# Patient Record
Sex: FEMALE | Race: WHITE | NOT HISPANIC OR LATINO | Employment: OTHER | URBAN - METROPOLITAN AREA
[De-identification: names, ages, dates, MRNs, and addresses within clinical notes are randomized per-mention and may not be internally consistent; named-entity substitution may affect disease eponyms.]

---

## 2017-02-09 RX ORDER — SOLIFENACIN SUCCINATE 10 MG/1
TABLET, FILM COATED ORAL
Qty: 30 TABLET | Refills: 3 | Status: SHIPPED | OUTPATIENT
Start: 2017-02-09 | End: 2017-05-24 | Stop reason: SDUPTHER

## 2017-02-09 RX ORDER — LOSARTAN POTASSIUM 25 MG/1
TABLET ORAL
Qty: 60 TABLET | Refills: 3 | Status: SHIPPED | OUTPATIENT
Start: 2017-02-09 | End: 2017-05-24 | Stop reason: SDUPTHER

## 2017-02-09 RX ORDER — DONEPEZIL HYDROCHLORIDE 5 MG/1
TABLET, FILM COATED ORAL
Qty: 30 TABLET | Refills: 3 | Status: SHIPPED | OUTPATIENT
Start: 2017-02-09 | End: 2017-05-24 | Stop reason: SDUPTHER

## 2017-03-01 RX ORDER — LEVOTHYROXINE SODIUM 0.07 MG/1
TABLET ORAL
Qty: 28 TABLET | Refills: 0 | Status: SHIPPED | OUTPATIENT
Start: 2017-03-01 | End: 2017-04-18 | Stop reason: SDUPTHER

## 2017-03-01 RX ORDER — UBIDECARENONE 200 MG
CAPSULE ORAL
Qty: 28 EACH | Refills: 3 | Status: SHIPPED | OUTPATIENT
Start: 2017-03-01 | End: 2017-06-01 | Stop reason: SDUPTHER

## 2017-03-09 DIAGNOSIS — E11.8 TYPE 2 DIABETES MELLITUS WITH COMPLICATION (HCC): ICD-10-CM

## 2017-03-09 RX ORDER — SAXAGLIPTIN 5 MG/1
TABLET, FILM COATED ORAL
Qty: 28 TABLET | Refills: 0 | Status: SHIPPED | OUTPATIENT
Start: 2017-03-09 | End: 2017-04-18 | Stop reason: SDUPTHER

## 2017-04-18 DIAGNOSIS — E03.9 HYPOTHYROIDISM, UNSPECIFIED TYPE: Primary | ICD-10-CM

## 2017-04-18 DIAGNOSIS — E53.9 VITAMIN B DEFICIENCY: ICD-10-CM

## 2017-04-18 DIAGNOSIS — E11.8 TYPE 2 DIABETES MELLITUS WITH COMPLICATION (HCC): ICD-10-CM

## 2017-04-19 RX ORDER — LANOLIN ALCOHOL/MO/W.PET/CERES
CREAM (GRAM) TOPICAL
Qty: 28 TABLET | Refills: 11 | Status: SHIPPED | OUTPATIENT
Start: 2017-04-19

## 2017-04-19 RX ORDER — SAXAGLIPTIN 5 MG/1
TABLET, FILM COATED ORAL
Qty: 28 TABLET | Refills: 11 | Status: SHIPPED | OUTPATIENT
Start: 2017-04-19 | End: 2017-06-07

## 2017-04-19 RX ORDER — LEVOTHYROXINE SODIUM 0.07 MG/1
TABLET ORAL
Qty: 28 TABLET | Refills: 11 | Status: SHIPPED | OUTPATIENT
Start: 2017-04-19 | End: 2018-03-27 | Stop reason: SDUPTHER

## 2017-05-24 DIAGNOSIS — E78.5 HYPERLIPIDEMIA, UNSPECIFIED HYPERLIPIDEMIA TYPE: ICD-10-CM

## 2017-05-24 DIAGNOSIS — G31.09 OTHER FRONTOTEMPORAL DEMENTIA: ICD-10-CM

## 2017-05-24 DIAGNOSIS — E11.9 TYPE 2 DIABETES MELLITUS WITHOUT COMPLICATION, UNSPECIFIED LONG TERM INSULIN USE STATUS: Primary | ICD-10-CM

## 2017-05-24 DIAGNOSIS — F02.80 OTHER FRONTOTEMPORAL DEMENTIA: ICD-10-CM

## 2017-05-24 DIAGNOSIS — I10 ESSENTIAL HYPERTENSION: ICD-10-CM

## 2017-05-24 DIAGNOSIS — R32 URINARY INCONTINENCE, UNSPECIFIED TYPE: ICD-10-CM

## 2017-05-24 RX ORDER — DONEPEZIL HYDROCHLORIDE 5 MG/1
5 TABLET, FILM COATED ORAL NIGHTLY
Qty: 30 TABLET | Refills: 11 | Status: SHIPPED | OUTPATIENT
Start: 2017-05-24 | End: 2018-04-27 | Stop reason: SDUPTHER

## 2017-05-24 RX ORDER — SOLIFENACIN SUCCINATE 10 MG/1
10 TABLET, FILM COATED ORAL EVERY EVENING
Qty: 30 TABLET | Refills: 11 | Status: SHIPPED | OUTPATIENT
Start: 2017-05-24 | End: 2018-04-27 | Stop reason: SDUPTHER

## 2017-05-24 RX ORDER — LOSARTAN POTASSIUM 25 MG/1
25 TABLET ORAL 2 TIMES DAILY
Qty: 60 TABLET | Refills: 11 | Status: SHIPPED | OUTPATIENT
Start: 2017-05-24 | End: 2018-04-27 | Stop reason: SDUPTHER

## 2017-05-24 RX ORDER — LOVASTATIN 40 MG/1
40 TABLET ORAL NIGHTLY
Qty: 30 TABLET | Refills: 11 | Status: SHIPPED | OUTPATIENT
Start: 2017-05-24 | End: 2018-04-27 | Stop reason: SDUPTHER

## 2017-06-01 RX ORDER — UBIDECARENONE 200 MG
CAPSULE ORAL
Qty: 28 EACH | Refills: 11 | Status: SHIPPED | OUTPATIENT
Start: 2017-06-01 | End: 2018-04-27 | Stop reason: SDUPTHER

## 2017-06-07 ENCOUNTER — OFFICE VISIT (OUTPATIENT)
Dept: INTERNAL MEDICINE | Facility: CLINIC | Age: 77
End: 2017-06-07

## 2017-06-07 VITALS
WEIGHT: 133 LBS | DIASTOLIC BLOOD PRESSURE: 62 MMHG | SYSTOLIC BLOOD PRESSURE: 118 MMHG | HEIGHT: 63 IN | BODY MASS INDEX: 23.57 KG/M2 | HEART RATE: 72 BPM

## 2017-06-07 DIAGNOSIS — E22.2 SYNDROME OF INAPPROPRIATE SECRETION OF ANTIDIURETIC HORMONE (HCC): ICD-10-CM

## 2017-06-07 DIAGNOSIS — E03.9 ACQUIRED HYPOTHYROIDISM: ICD-10-CM

## 2017-06-07 DIAGNOSIS — E78.49 OTHER HYPERLIPIDEMIA: ICD-10-CM

## 2017-06-07 DIAGNOSIS — G43.109 MIGRAINE WITH AURA AND WITHOUT STATUS MIGRAINOSUS, NOT INTRACTABLE: ICD-10-CM

## 2017-06-07 DIAGNOSIS — G31.09 FRONTOTEMPORAL DEMENTIA (HCC): Primary | ICD-10-CM

## 2017-06-07 DIAGNOSIS — I10 ESSENTIAL HYPERTENSION: ICD-10-CM

## 2017-06-07 DIAGNOSIS — F02.80 FRONTOTEMPORAL DEMENTIA (HCC): Primary | ICD-10-CM

## 2017-06-07 DIAGNOSIS — E11.9 TYPE 2 DIABETES MELLITUS WITHOUT COMPLICATION, WITHOUT LONG-TERM CURRENT USE OF INSULIN (HCC): ICD-10-CM

## 2017-06-07 DIAGNOSIS — G91.2 NORMAL PRESSURE HYDROCEPHALUS (HCC): ICD-10-CM

## 2017-06-07 DIAGNOSIS — E04.2 NON-TOXIC MULTINODULAR GOITER: ICD-10-CM

## 2017-06-07 LAB
ALBUMIN SERPL-MCNC: 4.04 G/DL (ref 3.4–4.6)
ALBUMIN/GLOB SERPL: 1.5 G/DL
ALP SERPL-CCNC: 59 U/L (ref 46–116)
ALT SERPL W P-5'-P-CCNC: 25 U/L (ref 14–59)
ANION GAP SERPL CALCULATED.3IONS-SCNC: 10 MMOL/L
AST SERPL-CCNC: 20 U/L (ref 7–37)
BASOPHILS # BLD AUTO: 0.04 10*3/MM3 (ref 0–0.2)
BASOPHILS NFR BLD AUTO: 0.4 % (ref 0–2)
BILIRUB SERPL-MCNC: 0.4 MG/DL (ref 0.2–1)
BUN BLD-MCNC: 21 MG/DL (ref 6–22)
BUN/CREAT SERPL: 20.6 (ref 7–25)
CALCIUM SPEC-SCNC: 9.6 MG/DL (ref 8.6–10.5)
CHLORIDE SERPL-SCNC: 99 MMOL/L (ref 95–107)
CO2 SERPL-SCNC: 26 MMOL/L (ref 23–32)
CREAT BLD-MCNC: 1.02 MG/DL (ref 0.55–1.02)
DEPRECATED RDW RBC AUTO: 39.5 FL (ref 37–54)
EOSINOPHIL # BLD AUTO: 0.08 10*3/MM3 (ref 0–0.7)
EOSINOPHIL NFR BLD AUTO: 0.9 % (ref 0–5)
ERYTHROCYTE [DISTWIDTH] IN BLOOD BY AUTOMATED COUNT: 11.5 % (ref 11.5–15)
GFR SERPL CREATININE-BSD FRML MDRD: 53 ML/MIN/1.73
GLOBULIN UR ELPH-MCNC: 2.7 GM/DL
GLUCOSE BLD-MCNC: 208 MG/DL (ref 70–100)
HBA1C MFR BLD: 6.7 % (ref 4–6)
HCT VFR BLD AUTO: 38.8 % (ref 34.1–44.9)
HGB BLD-MCNC: 12.9 G/DL (ref 11.2–15.7)
LYMPHOCYTES # BLD AUTO: 1.47 10*3/MM3 (ref 0.8–7)
LYMPHOCYTES NFR BLD AUTO: 16.4 % (ref 10–60)
MCH RBC QN AUTO: 31.6 PG (ref 26–34)
MCHC RBC AUTO-ENTMCNC: 33.2 G/DL (ref 31–37)
MCV RBC AUTO: 95.1 FL (ref 80–100)
MONOCYTES # BLD AUTO: 0.94 10*3/MM3 (ref 0–1)
MONOCYTES NFR BLD AUTO: 10.5 % (ref 0–13)
NEUTROPHILS # BLD AUTO: 6.44 10*3/MM3 (ref 1–11)
NEUTROPHILS NFR BLD AUTO: 71.8 % (ref 30–85)
PLATELET # BLD AUTO: 305 10*3/MM3 (ref 150–450)
PMV BLD AUTO: 11.2 FL (ref 6–12)
POTASSIUM BLD-SCNC: 4.9 MMOL/L (ref 3.3–5.3)
PROT SERPL-MCNC: 6.7 G/DL (ref 6.3–8.4)
RBC # BLD AUTO: 4.08 10*6/MM3 (ref 3.93–5.22)
SODIUM BLD-SCNC: 135 MMOL/L (ref 136–145)
TSH SERPL DL<=0.05 MIU/L-ACNC: 0.47 MIU/ML (ref 0.4–4.2)
WBC NRBC COR # BLD: 8.97 10*3/MM3 (ref 5–10)

## 2017-06-07 PROCEDURE — 36415 COLL VENOUS BLD VENIPUNCTURE: CPT | Performed by: INTERNAL MEDICINE

## 2017-06-07 PROCEDURE — 83036 HEMOGLOBIN GLYCOSYLATED A1C: CPT | Performed by: INTERNAL MEDICINE

## 2017-06-07 PROCEDURE — 85025 COMPLETE CBC W/AUTO DIFF WBC: CPT | Performed by: INTERNAL MEDICINE

## 2017-06-07 PROCEDURE — 84443 ASSAY THYROID STIM HORMONE: CPT | Performed by: INTERNAL MEDICINE

## 2017-06-07 PROCEDURE — 99214 OFFICE O/P EST MOD 30 MIN: CPT | Performed by: INTERNAL MEDICINE

## 2017-06-07 PROCEDURE — 80053 COMPREHEN METABOLIC PANEL: CPT | Performed by: INTERNAL MEDICINE

## 2017-06-07 NOTE — PROGRESS NOTES
Subjective   Rissa Hodges is a 76 y.o. female.     History of Present Illness she is here today for her yearly visit which includes follow-up of dementia, hypertension, hypercholesterolemia, diabetes mellitus, and normal pressure hydrocephalus.  She is living in assisted living.  Unfortunately she recently had her  cat die.  She has been depressed about this naturally.  Her daughter relates that she has been less motivated to leave her room over the last several months.  She eats once or twice per day generally.  She often has the room temperature turned up to excessive levels.  Occasionally she will have some dysphagia and cough.  She denies any dizziness or headache.  There have been no falls.  She does use her walker at all times.  She denied any dyspnea, chest pain, swelling in the ankles, wheezing, or PND.  The remainder of her review systems is negative.        Review of Systems   Constitutional: Positive for activity change. Negative for fatigue.   HENT: Positive for trouble swallowing.    Respiratory: Positive for cough. Negative for chest tightness, shortness of breath and wheezing.    Cardiovascular: Negative for chest pain, palpitations and leg swelling.   Gastrointestinal: Negative for abdominal pain, anal bleeding, blood in stool, constipation, diarrhea, nausea and vomiting.   Genitourinary: Negative for difficulty urinating, dysuria, flank pain, frequency and hematuria.   Musculoskeletal: Negative for arthralgias, back pain and gait problem.   Neurological: Positive for headaches. Negative for dizziness, syncope, facial asymmetry, speech difficulty and weakness.   Psychiatric/Behavioral: The patient is not nervous/anxious.        Objective   Physical Exam   Constitutional: Vital signs are normal. She appears well-developed and well-nourished. She is active. She does not appear ill.   Eyes: Conjunctivae are normal.   Fundoscopic exam:       The right eye shows no AV nicking, no exudate and no  hemorrhage.        The left eye shows no AV nicking, no exudate and no hemorrhage.   Neck: Carotid bruit is not present. No thyroid mass and no thyromegaly present.   Cardiovascular: Normal rate, regular rhythm, S1 normal and S2 normal.  Exam reveals no S3 and no S4.    No murmur heard.  Pulses:       Posterior tibial pulses are 2+ on the right side, and 2+ on the left side.   Pulmonary/Chest: No tachypnea. No respiratory distress. She has no decreased breath sounds. She has no wheezes. She has no rhonchi. She has no rales.   Abdominal: Soft. Normal appearance and bowel sounds are normal. She exhibits no abdominal bruit and no mass. There is no hepatosplenomegaly.       Vascular Status -  Her exam exhibits no right foot edema. Her exam exhibits no left foot edema.  Neurological: She is alert. Gait abnormal.   Reflex Scores:       Bicep reflexes are 2+ on the right side.  Mild slowing only and stable with use of a walker   Psychiatric: She has a normal mood and affect. Her speech is normal and behavior is normal. Judgment and thought content normal. Cognition and memory are impaired.       Assessment/Plan November A1c was 7.4.  CMP was normal with the exception of a glucose of 199    Assessment #1 hypertension-good control #2 dementia-slowly progressive over the last 2 years #3 diabetes mellitus-must be careful for hypoglycemia with her decreased by mouth intake #4 normal pressure hydrocephalus-no sign of significant worsening #5 syndrome of inappropriate ADH-compensated with fluid restriction at present    Plan #1 no change medication #2 CBC, CMP, TSH, hemoglobin A1c today.  Routine follow-up with me in 6 months.

## 2017-06-07 NOTE — PROGRESS NOTES
Dr. Don wanted patient to stop Onglyza.  I called Josie, the daughter, to let her know.      I also called [facility].  I had to leave a message to tell them that the Onglyza needs to be stopped for them to not send in with her weekly medications.

## 2017-10-11 RX ORDER — CHLORAL HYDRATE 500 MG
CAPSULE ORAL
Qty: 30 CAPSULE | Refills: 11 | Status: SHIPPED | OUTPATIENT
Start: 2017-10-11 | End: 2018-09-27 | Stop reason: SDUPTHER

## 2017-11-03 ENCOUNTER — OFFICE VISIT (OUTPATIENT)
Dept: INTERNAL MEDICINE | Facility: CLINIC | Age: 77
End: 2017-11-03

## 2017-11-03 VITALS
BODY MASS INDEX: 22.86 KG/M2 | DIASTOLIC BLOOD PRESSURE: 70 MMHG | WEIGHT: 129 LBS | SYSTOLIC BLOOD PRESSURE: 120 MMHG | HEART RATE: 78 BPM | HEIGHT: 63 IN

## 2017-11-03 DIAGNOSIS — I10 ESSENTIAL HYPERTENSION: ICD-10-CM

## 2017-11-03 DIAGNOSIS — F02.80 FRONTOTEMPORAL DEMENTIA (HCC): ICD-10-CM

## 2017-11-03 DIAGNOSIS — G91.2 NORMAL PRESSURE HYDROCEPHALUS (HCC): ICD-10-CM

## 2017-11-03 DIAGNOSIS — E03.9 ACQUIRED HYPOTHYROIDISM: ICD-10-CM

## 2017-11-03 DIAGNOSIS — E11.9 TYPE 2 DIABETES MELLITUS WITHOUT COMPLICATION, WITHOUT LONG-TERM CURRENT USE OF INSULIN (HCC): Primary | ICD-10-CM

## 2017-11-03 DIAGNOSIS — G31.09 FRONTOTEMPORAL DEMENTIA (HCC): ICD-10-CM

## 2017-11-03 DIAGNOSIS — E22.2 SYNDROME OF INAPPROPRIATE SECRETION OF ANTIDIURETIC HORMONE (HCC): ICD-10-CM

## 2017-11-03 LAB
ALBUMIN SERPL-MCNC: 4.3 G/DL (ref 3.5–5.2)
ALBUMIN/GLOB SERPL: 1.7 G/DL
ALP SERPL-CCNC: 52 U/L (ref 39–117)
ALT SERPL W P-5'-P-CCNC: 13 U/L (ref 1–33)
ANION GAP SERPL CALCULATED.3IONS-SCNC: 14.1 MMOL/L
AST SERPL-CCNC: 12 U/L (ref 1–32)
BASOPHILS # BLD AUTO: 0.04 10*3/MM3 (ref 0–0.2)
BASOPHILS NFR BLD AUTO: 0.5 % (ref 0–2)
BILIRUB SERPL-MCNC: 0.4 MG/DL (ref 0.1–1.2)
BUN BLD-MCNC: 23 MG/DL (ref 8–23)
BUN/CREAT SERPL: 23.2 (ref 7–25)
CALCIUM SPEC-SCNC: 10.1 MG/DL (ref 8.6–10.5)
CHLORIDE SERPL-SCNC: 95 MMOL/L (ref 98–107)
CO2 SERPL-SCNC: 24.9 MMOL/L (ref 22–29)
CREAT BLD-MCNC: 0.99 MG/DL (ref 0.57–1)
DEPRECATED RDW RBC AUTO: 38.6 FL (ref 37–54)
EOSINOPHIL # BLD AUTO: 0.15 10*3/MM3 (ref 0–0.7)
EOSINOPHIL NFR BLD AUTO: 1.9 % (ref 0–5)
ERYTHROCYTE [DISTWIDTH] IN BLOOD BY AUTOMATED COUNT: 11.4 % (ref 11.5–15)
GFR SERPL CREATININE-BSD FRML MDRD: 54 ML/MIN/1.73
GLOBULIN UR ELPH-MCNC: 2.6 GM/DL
GLUCOSE BLD-MCNC: 212 MG/DL (ref 65–99)
HBA1C MFR BLD: 7.6 % (ref 4.8–5.6)
HCT VFR BLD AUTO: 38.5 % (ref 34.1–44.9)
HGB BLD-MCNC: 13 G/DL (ref 11.2–15.7)
LYMPHOCYTES # BLD AUTO: 1.7 10*3/MM3 (ref 0.8–7)
LYMPHOCYTES NFR BLD AUTO: 21.5 % (ref 10–60)
MCH RBC QN AUTO: 31.9 PG (ref 26–34)
MCHC RBC AUTO-ENTMCNC: 33.8 G/DL (ref 31–37)
MCV RBC AUTO: 94.4 FL (ref 80–100)
MONOCYTES # BLD AUTO: 0.97 10*3/MM3 (ref 0–1)
MONOCYTES NFR BLD AUTO: 12.3 % (ref 0–13)
NEUTROPHILS # BLD AUTO: 5.04 10*3/MM3 (ref 1–11)
NEUTROPHILS NFR BLD AUTO: 63.8 % (ref 30–85)
PLATELET # BLD AUTO: 279 10*3/MM3 (ref 150–450)
PMV BLD AUTO: 10.5 FL (ref 6–12)
POTASSIUM BLD-SCNC: 4.5 MMOL/L (ref 3.5–5.2)
PROT SERPL-MCNC: 6.9 G/DL (ref 6–8.5)
RBC # BLD AUTO: 4.08 10*6/MM3 (ref 3.93–5.22)
SODIUM BLD-SCNC: 134 MMOL/L (ref 136–145)
TSH SERPL DL<=0.05 MIU/L-ACNC: 2.48 MIU/ML (ref 0.27–4.2)
VIT B12 BLD-MCNC: >2000 PG/ML (ref 211–946)
WBC NRBC COR # BLD: 7.9 10*3/MM3 (ref 5–10)

## 2017-11-03 PROCEDURE — 85025 COMPLETE CBC W/AUTO DIFF WBC: CPT | Performed by: INTERNAL MEDICINE

## 2017-11-03 PROCEDURE — 84443 ASSAY THYROID STIM HORMONE: CPT | Performed by: INTERNAL MEDICINE

## 2017-11-03 PROCEDURE — 99214 OFFICE O/P EST MOD 30 MIN: CPT | Performed by: INTERNAL MEDICINE

## 2017-11-03 PROCEDURE — 36415 COLL VENOUS BLD VENIPUNCTURE: CPT | Performed by: INTERNAL MEDICINE

## 2017-11-03 PROCEDURE — 83036 HEMOGLOBIN GLYCOSYLATED A1C: CPT | Performed by: INTERNAL MEDICINE

## 2017-11-03 PROCEDURE — 82607 VITAMIN B-12: CPT | Performed by: INTERNAL MEDICINE

## 2017-11-03 PROCEDURE — 80053 COMPREHEN METABOLIC PANEL: CPT | Performed by: INTERNAL MEDICINE

## 2017-11-03 NOTE — PROGRESS NOTES
Subjective   Rissa Hodges is a 77 y.o. female.     History of Present Illness he is here today for yearly follow-up of hypertension, hypercholesterolemia, dementia, and history of normal pressure hydrocephalus with shunt placement.  The daughter relates that at assisted living she sleeps all the time.  She is reluctant to walk and she is reluctant to leave her room and to join others at mealtime.  The patient herself has few complaints although she relates feeling dizzy a lot of the time especially when standing up.  Her daughter does relate that she has been having difficulty with dysphagia and choking over the last few months.  The patient specifically denies headache or dysarthria.  She denies any blurred vision or double vision.  There has been no focal paresis or paresthesias.  She continues to walk with her walker.  She denies any cough although she does relate she is short of breath all the time.  She denies any PND.  She has not had any swelling in the ankles.  She denies chest pain.  There has been no nausea or vomiting or abdominal pain.  She denies diarrhea.  There has been no dysuria.  She eats only once or twice per day.  Fasting blood sugar this morning was 186.        Review of Systems   Constitutional: Positive for activity change, appetite change and fatigue. Negative for fever.   HENT: Positive for trouble swallowing.    Respiratory: Negative for cough, chest tightness, shortness of breath and wheezing.    Cardiovascular: Negative for chest pain, palpitations and leg swelling.   Gastrointestinal: Negative for abdominal pain, anal bleeding, blood in stool, constipation, diarrhea, nausea and vomiting.   Genitourinary: Negative for dysuria, flank pain and hematuria.   Musculoskeletal: Positive for gait problem. Negative for arthralgias and back pain.   Neurological: Positive for dizziness. Negative for syncope, facial asymmetry, speech difficulty, weakness and headaches.   Psychiatric/Behavioral:  Negative for dysphoric mood. The patient is not nervous/anxious.        Objective   Physical Exam   Constitutional: She appears well-developed and well-nourished. She is active. She does not appear ill.   Eyes: Conjunctivae are normal.   Fundoscopic exam:       The right eye shows no AV nicking, no exudate and no hemorrhage.        The left eye shows no AV nicking, no exudate and no hemorrhage.   Neck: Carotid bruit is not present.   Cardiovascular: Normal rate, regular rhythm, S1 normal and S2 normal.   Occasional extrasystoles are present. Exam reveals no S3 and no S4.    No murmur heard.  Pulmonary/Chest: No tachypnea. No respiratory distress. She has no decreased breath sounds. She has no wheezes. She has no rhonchi. She has no rales.   Abdominal: Soft. Normal appearance and bowel sounds are normal. She exhibits no abdominal bruit and no mass. There is no hepatosplenomegaly. There is no tenderness.       Vascular Status -  Her exam exhibits no right foot edema. Her exam exhibits no left foot edema.  Lymphadenopathy:     She has no cervical adenopathy.     She has no axillary adenopathy.   Neurological: She is alert. She has normal strength. She displays no tremor. No cranial nerve deficit. She displays a negative Romberg sign. Gait abnormal.   Reflex Scores:       Bicep reflexes are 1+ on the right side and 1+ on the left side.  She walks slowly with small steps unassisted.  She walks well with walker however.   Psychiatric: She has a normal mood and affect. Her speech is normal and behavior is normal. Thought content normal. Cognition and memory are impaired.       Assessment/Plan  blood pressure is 144/73 lying and 130/68 standing.  Lab work in June showed a normal CBC and TSH.  Hemoglobin A1c was 6.7.  Sodium 135 glucose 208.    Assessment #1 dementia-most likely she is having progressive deterioration on this basis.  #2 hypertension-good control and without significant orthostatic hypotension #3  dysphagia-likely related to dementia #4 history of normal pressure hydrocephalus-without recurrence but of course this must be considered #5 syndrome of inappropriate ADH-must be certain that she does not have more severe hyponatremia #6 diabetes mellitus-likely reasonable control without hypoglycemia.      Plan #1 no change medication #2 CBC, CMP, urinalysis, TSH, B12 level #3 video swallowing study #4 CT scan of the head without IV contrast.  Routine follow-up with me in one month.  35 minutes was spent in this visit.

## 2017-11-13 ENCOUNTER — HOSPITAL ENCOUNTER (OUTPATIENT)
Dept: GENERAL RADIOLOGY | Facility: HOSPITAL | Age: 77
Discharge: HOME | End: 2017-11-13
Attending: INTERNAL MEDICINE

## 2017-11-13 ENCOUNTER — HOSPITAL ENCOUNTER (OUTPATIENT)
Dept: CT IMAGING | Facility: HOSPITAL | Age: 77
Discharge: HOME | End: 2017-11-13
Attending: INTERNAL MEDICINE | Admitting: INTERNAL MEDICINE

## 2017-11-13 DIAGNOSIS — G31.09 FRONTOTEMPORAL DEMENTIA (HCC): ICD-10-CM

## 2017-11-13 DIAGNOSIS — F02.80 FRONTOTEMPORAL DEMENTIA (HCC): ICD-10-CM

## 2017-11-13 PROCEDURE — G8996 SWALLOW CURRENT STATUS: HCPCS

## 2017-11-13 PROCEDURE — 70450 CT HEAD/BRAIN W/O DYE: CPT

## 2017-11-13 PROCEDURE — 74230 X-RAY XM SWLNG FUNCJ C+: CPT

## 2017-11-13 PROCEDURE — G8997 SWALLOW GOAL STATUS: HCPCS

## 2017-11-13 PROCEDURE — G8998 SWALLOW D/C STATUS: HCPCS

## 2017-11-13 PROCEDURE — 92611 MOTION FLUOROSCOPY/SWALLOW: CPT

## 2017-11-13 NOTE — MBS/VFSS/FEES
Outpatient Speech Language Pathology   Adult Swallow Initial Evaluation  [facility]     Patient Name: Rissa Hodges  : 1940  MRN: [id number]  Today's Date: 2017         Visit Date: 2017   Patient Active Problem List   Diagnosis   • Migraine with aura   • Compression fracture of lumbar vertebra   • Type 2 diabetes mellitus   • Diabetic peripheral neuropathy   • Frontotemporal dementia   • Hyperlipidemia   • Hypertension   • Syndrome of inappropriate secretion of antidiuretic hormone   • Degeneration of intervertebral disc of lumbar region   • Non-toxic multinodular goiter   • Normal pressure hydrocephalus   • Osteopenia   • Hypothyroidism   • Urinary incontinence   • Vitamin D deficiency        Past Medical History:   Diagnosis Date   • Cognitive dysfunction    • Confusion    • Dementia    • Diabetes mellitus with neurological manifestations, uncontrolled    • Hearing loss of both ears    • HLD (hyperlipidemia)    • HTN (hypertension)    • Hydrocephalus    • Hyponatremia    • Osteopenia    • Sinusitis    • Uncontrolled type 2 diabetes mellitus with complication    • Vitamin D deficiency    SPEECH-LANGUAGE PATHOLOGY EVALUTION - VFSS  Subjective: The patient was seen on this date for a VFSS(Videofluoroscopic Swallowing Study).  Patient was alert and cooperative.  Pt w/ dementia per MD.  Pt hard of hearing.  Daughter available for eval, waited outside, then educated w/ results.  The patient's history is significant for dementia, .   Objective: Risks/benefits were reviewed with the patient and family, and consent was obtained. The study was completed with SLP present and Radiologist review. The patient was seen in lateral view(s). Textures given included thin liquid, nectar thick liquid, puree consistency, mechanical soft consistency and regular consistency.  Assessment: Difficulties were noted with regular consistency, characterized by prespill of partially masticated solids to valleculae.  No  aspiration/penetration noted with any consistencies.  Functional swallow.     SLP Findings: Patient presents with functional swallow.     Recommendations: Diet Textures: thin liquid, regular consistency food. Medications should be taken whole with thin liquids.   Recommended Strategies: Upright for PO. Oral care before breakfast, after all meals and PRN.  Other Recommended Evaluations:    Dysphagia therapy is not recommended. Rationale: functional swallow.         Past Surgical History:   Procedure Laterality Date   • BACK SURGERY     • CSF SHUNT  07/2008    for NPH         Visit Dx:     ICD-10-CM ICD-9-CM   1. Frontotemporal dementia G31.09 331.19    F02.80                    Adult Dysphagia - 11/13/17 1000     Adult Dysphagia Background    Patient Description of Complaint difficulty swallowing  -MG    Symptoms Reported by Patient Difficulty swallowing solids  -MG    Current Diet (Solids) Regular  -MG    Diet Level (Liquids) Thin Liquid  -MG    Oral Mech    Respiratory/Swallow Coordination Pattern WFL  -MG    Position During Evaluation Upright  -MG    Anatomy/Physiology WNL Patient demonstrates anatomy that is WNL  -MG    Dentition Dentures were worn for this evaluation  -MG    Oral Health Oral cavity is clean  -MG    Awareness/Control of Secretions Patient swallows saliva  -MG    Foods/Liquids Presented    Method of Administration Spoon;Cup;Straw;Self-fed;Fed by examiner  -MG    Spoon    Consistency Thin;Nectar;Puree;Hard Solid;Mixed Consistency  -MG    SLP Communication to Radiology    Severity Level of Dysphagia mild dysphagia;pharyngeal dysfunction  -MG    Summary Statement Pt w/ functional swallow.  No aspiration/penetration. Mild prespill to valleculae w/ thins, partially masticated prespill solids to valleculae, cleared w/ swallow  -MG    Results/Recs/Barriers/Education for Dysphagia    Factors Affecting Performance no difficulty participating in study  -MG      User Key  (r) = Recorded By, (t) = Taken By,  (c) = Cosigned By    Initials Name Provider Type    MG Maricruz Kuo MA,CCC-SLP Speech and Language Pathologist                            OP SLP Education       11/13/17 1021    Education    Barriers to Learning No barriers identified  -MG    Education Provided Described results of evaluation;Family/caregivers expressed understanding of evaluation;Patient expressed understanding of evaluation  -MG    Assessed Learning needs  -MG    Learning Motivation Strong  -MG    Learning Method Explanation  -MG    Teaching Response Verbalized understanding;Demonstrated understanding   daughter verbalized understanding, pt demonstrated understanding, ?dementia  -MG      User Key  (r) = Recorded By, (t) = Taken By, (c) = Cosigned By    Initials Name Effective Dates    MG Maricruz Kuo MA,CCC-SLP 04/13/15 -                       SLP Outcome Measures (last 72 hours)      SLP Outcome Measures       11/13/17 1000          SLP Outcome Measures    Outcome Measure Used? Adult NOMS  -MG      FCM Scores    FCM Chosen Swallowing  -MG      Swallowing FCM Score 7  -MG        User Key  (r) = Recorded By, (t) = Taken By, (c) = Cosigned By    Initials Name Effective Dates    MG Maricruz Kuo MA, CCC-SLP 04/13/15 -                Time Calculation:   SLP Start Time: 0915  SLP Stop Time: 1000  SLP Time Calculation (min): 45 min    Therapy Charges for Today     Code Description Service Date Service Provider Modifiers Qty    11018478954 HC ST SWALLOWING CURRENT STATUS 11/13/2017 Maricruz Kuo MACCC-SLP ,  1    86662411105 HC ST SWALLOWING PROJECTED 11/13/2017 Maricruz Kuo [facility]-SLP RICO,  1    87044673393 HC ST SWALLOWING DISCHARGE 11/13/2017 Maricruz Kuo [facility]-SLP ,  1    01598218524 HC ST MOTION FLUORO EVAL SWALLOW 3 11/13/2017 Maricruz Kuo MAC[facility]-SLP GN 1          SLP G-Codes  SLP NOMS Used?: Yes  Functional Limitations: Swallowing  Swallow Current Status (): 0 percent impaired, limited or  restricted  Swallow Goal Status (): 0 percent impaired, limited or restricted  Swallow Discharge Status (): 0 percent impaired, limited or restricted        Maricruz Kuo MA,CCC-SLP  11/13/2017

## 2017-11-13 NOTE — SIGNIFICANT NOTE
11/13/17 1021   Education   Barriers to Learning No barriers identified   Education Provided Described results of evaluation;Family/caregivers expressed understanding of evaluation;Patient expressed understanding of evaluation   Assessed Learning needs   Learning Motivation Strong   Learning Method Explanation   Teaching Response Verbalized understanding;Demonstrated understanding  (daughter verbalized understanding, pt demonstrated understanding, ?dementia)

## 2017-11-14 ENCOUNTER — TELEPHONE (OUTPATIENT)
Dept: INTERNAL MEDICINE | Facility: CLINIC | Age: 77
End: 2017-11-14

## 2017-11-14 DIAGNOSIS — G91.9 HYDROCEPHALUS IN ADULT (HCC): Primary | ICD-10-CM

## 2017-11-14 NOTE — TELEPHONE ENCOUNTER
----- Message from Sacha Don Jr., MD sent at 11/14/2017  9:14 AM EST -----  Video swallowing study fortunately did not show any aspiration.  CT scan of the head does show mild increase in hydrocephalus.  This is of unclear significance.  We need to consult Dr. Trotter neurosurgery.

## 2017-11-15 ENCOUNTER — TELEPHONE (OUTPATIENT)
Dept: INTERNAL MEDICINE | Facility: CLINIC | Age: 77
End: 2017-11-15

## 2017-11-15 NOTE — TELEPHONE ENCOUNTER
----- Message from Sweetie Schwartz sent at 11/15/2017  9:48 AM EST -----  Contact: [facility]  Pharmacy called needing clarification on the quantity and number of refills for Centrum Silver.  Please advise.    [facility] - [address] - [phone] x [phone]  - [phone] FX

## 2017-12-22 ENCOUNTER — OFFICE VISIT (OUTPATIENT)
Dept: NEUROSURGERY | Facility: CLINIC | Age: 77
End: 2017-12-22

## 2017-12-22 VITALS
WEIGHT: 129 LBS | HEART RATE: 66 BPM | SYSTOLIC BLOOD PRESSURE: 128 MMHG | HEIGHT: 63 IN | BODY MASS INDEX: 22.86 KG/M2 | DIASTOLIC BLOOD PRESSURE: 76 MMHG

## 2017-12-22 DIAGNOSIS — G91.2 NORMAL PRESSURE HYDROCEPHALUS (HCC): Primary | ICD-10-CM

## 2017-12-22 DIAGNOSIS — R26.9 ABNORMALITY OF GAIT: ICD-10-CM

## 2017-12-22 PROCEDURE — 99203 OFFICE O/P NEW LOW 30 MIN: CPT | Performed by: NEUROLOGICAL SURGERY

## 2017-12-22 NOTE — PROGRESS NOTES
Subjective   Patient ID: Rissa Hodges is a 77 y.o. female is being seen for consultation today at the request of Sacha Don Jr., MD hydrocephalus.     Today the patient reports increased headaches, fatigue, dizziness and trouble with her memory. She also complains of bladder incontinence.    History of Present Illness    The following portions of the patient's history were reviewed and updated as appropriate: allergies, current medications, past family history, past medical history, past social history, past surgical history and problem list.    Review of Systems   Constitutional: Positive for fatigue.   HENT: Positive for hearing loss.    Endocrine: Positive for cold intolerance.   Neurological: Positive for dizziness and weakness.   Psychiatric/Behavioral: Positive for decreased concentration.   All other systems reviewed and are negative.    The patient is with her daughter.  She has a history of having been shunted in 2008 in [place] by Dr. Randy Das for normal pressure hydrocephalus.  Apparently she had a Cogman programmable valve placed, set at 150 mmHg.  The daughter says that the patient's gait improved considerably.  She also said oddly enough that she had headache prior to surgery.  The patient states that she had a history of migraines even before the surgery and well into adulthood.  She has now been living in [place] for 4 years.  The daughter notices that she has been having more difficulties over the last year or 2.  No significant headaches.  They have not seen a neurosurgeon since moving here.  The shunt moves well but pumps well but refills somewhat slowly.  Her skin is quite thin even though the shunt apparatus has not broken through and I told the patient's daughter that she should be very careful about combing or brushing her hair and that the hairdressers need to be very careful when they wash her hair.      It is hard to pin this down and to say that the deterioration is result of  either shunt malfunction or NPH per se.  She has normal reflexes so I doubt any cervical cord compression.  I told them at the start that we should check to see what the setting is to make sure it has not changed since the surgery 9 years ago.  Supposed to be at 150.  Will also check a shunt series.  It is possible that we might need to do a shuntogram but will wait until after the next visit to determine that.  I do not think she needs an MRI just at this point.        Objective   Physical Exam   Constitutional: She is oriented to person, place, and time. She appears well-developed and well-nourished.   HENT:   Head: Normocephalic and atraumatic.   Eyes: Conjunctivae and EOM are normal. Pupils are equal, round, and reactive to light.   Fundoscopic exam:       The right eye shows no papilledema. The right eye shows venous pulsations.        The left eye shows no papilledema. The left eye shows venous pulsations.   Neck: Carotid bruit is not present.   Neurological: She is oriented to person, place, and time. She has a normal Finger-Nose-Finger Test and a normal Heel to Shin Test. Gait normal.   Reflex Scores:       Tricep reflexes are 2+ on the right side and 2+ on the left side.       Bicep reflexes are 2+ on the right side and 2+ on the left side.       Brachioradialis reflexes are 2+ on the right side and 2+ on the left side.       Patellar reflexes are 2+ on the right side and 2+ on the left side.       Achilles reflexes are 2+ on the right side and 2+ on the left side.  Psychiatric: Her speech is normal.     Neurologic Exam     Mental Status   Oriented to person, place, and time.   Registration of memory: Good recent and remote memory.   Attention: normal. Concentration: normal.   Speech: speech is normal   Level of consciousness: alert  Knowledge: consistent with education.     Cranial Nerves     CN II   Visual fields full to confrontation.   Visual acuity: normal    CN III, IV, VI   Pupils are equal, round,  and reactive to light.  Extraocular motions are normal.     CN V   Facial sensation intact.   Right corneal reflex: normal  Left corneal reflex: normal    CN VII   Facial expression full, symmetric.   Right facial weakness: none  Left facial weakness: none    CN VIII   Hearing: intact    CN IX, X   Palate: symmetric    CN XI   Right sternocleidomastoid strength: normal  Left sternocleidomastoid strength: normal    CN XII   Tongue: not atrophic  Tongue deviation: none    Motor Exam   Muscle bulk: normal  Right arm tone: normal  Left arm tone: normal  Right leg tone: normal  Left leg tone: normal    Strength   Strength 5/5 except as noted.     Sensory Exam   Light touch normal.     Gait, Coordination, and Reflexes     Gait  Gait: normal    Coordination   Finger to nose coordination: normal  Heel to shin coordination: normal    Reflexes   Right brachioradialis: 2+  Left brachioradialis: 2+  Right biceps: 2+  Left biceps: 2+  Right triceps: 2+  Left triceps: 2+  Right patellar: 2+  Left patellar: 2+  Right achilles: 2+  Left achilles: 2+  Right : 2+  Left : 2+       Using a walker.  Shuffling gait.       Assessment/Plan   Independent Review of Radiographic Studies:    I reviewed the CT scan done on 11/13/2017 which showed a ventricular catheter into the frontal horns.  Oddly enough, the position looks slightly different compared to the 2015 scan despite there not being any shunt revision.   Agree with the report.         Medical Decision Making:    There are a lot of factors that may be involved here, possibly not even related to the shunt.  We will check the shunt setting, the fluoroscopic x-ray, the shunt series to check for continuity of the shunt.  The shunt pumps well, but refills slowly suggestive of a possible proximal problem.  We may need to do a shuntogram later on but will assess her after the x-rays are done.        Diagnoses and all orders for this visit:    Normal pressure hydrocephalus  -     XR  Shunt Series; Future  -     FL shunt series programable; Future    Abnormality of gait  -     XR Shunt Series; Future  -     FL shunt series programable; Future    Return in about 7 days (around 12/29/2017).

## 2017-12-29 ENCOUNTER — HOSPITAL ENCOUNTER (OUTPATIENT)
Dept: GENERAL RADIOLOGY | Facility: HOSPITAL | Age: 77
Discharge: HOME | End: 2017-12-29
Attending: NEUROLOGICAL SURGERY | Admitting: NEUROLOGICAL SURGERY

## 2017-12-29 DIAGNOSIS — G91.2 NORMAL PRESSURE HYDROCEPHALUS (HCC): ICD-10-CM

## 2017-12-29 DIAGNOSIS — R26.9 ABNORMALITY OF GAIT: ICD-10-CM

## 2017-12-29 PROCEDURE — 76000 FLUOROSCOPY <1 HR PHYS/QHP: CPT

## 2017-12-29 PROCEDURE — 74000 HC ABDOMEN KUB: CPT

## 2017-12-29 PROCEDURE — 71020 HC CHEST PA AND LATERAL: CPT

## 2017-12-29 PROCEDURE — 70250 X-RAY EXAM OF SKULL: CPT

## 2018-01-17 ENCOUNTER — OFFICE VISIT (OUTPATIENT)
Dept: NEUROSURGERY | Facility: CLINIC | Age: 78
End: 2018-01-17

## 2018-01-17 VITALS
WEIGHT: 129 LBS | HEIGHT: 63 IN | SYSTOLIC BLOOD PRESSURE: 122 MMHG | HEART RATE: 64 BPM | DIASTOLIC BLOOD PRESSURE: 78 MMHG | BODY MASS INDEX: 22.86 KG/M2

## 2018-01-17 DIAGNOSIS — R26.9 ABNORMALITY OF GAIT: ICD-10-CM

## 2018-01-17 DIAGNOSIS — Z45.41 ENCOUNTER FOR MANAGEMENT OF CEREBROSPINAL FLUID DRAINAGE DEVICE: ICD-10-CM

## 2018-01-17 DIAGNOSIS — G91.2 NORMAL PRESSURE HYDROCEPHALUS (HCC): Primary | ICD-10-CM

## 2018-01-17 PROCEDURE — 99213 OFFICE O/P EST LOW 20 MIN: CPT | Performed by: NEUROLOGICAL SURGERY

## 2018-01-17 NOTE — PROGRESS NOTES
Subjective   Patient ID: Rissa Hodges is a 77 y.o. female is here today for follow-up on hydrocephalus.    At the patient's last visit she reported increased headaches, fatigue, dizziness and trouble with her memory. She also complains of bladder incontinence.    Today the patientt is here with a new x-ray. She reports that there have been no changes in her symptoms since last visit.    History of Present Illness    The following portions of the patient's history were reviewed and updated as appropriate: allergies, current medications, past family history, past medical history, past social history, past surgical history and problem list.    Review of Systems   Neurological: Positive for dizziness and headaches.   All other systems reviewed and are negative.    She is with her daughter. Unfortunately, the shunt series was not done, but the shunt x-rays showed that her Codman shunt is set at 110 mmHg. Today, it pumps and actually refills well exactly like it is supposed to.  The problems that she is having have to do mainly with sleepiness and dementia.  She is having more difficulty walking, but when she was shunted 10 years ago in [place], it was mainly the gait that was the problem and that was helped.  If we were to answer definitely if the shunt is working, I think a shuntogram is necessary, but I suspect the answer is that the shunt is working and we are dealing with some other dementing illness. I expressed this to the daughter, but again if we want a definite answer, then a shuntogram along with getting a shunt series would be helpful.  The daughter will think about it and will let us know if she would like to proceed.  She used to see Dr. Perico Lazar, but after Dr. Lazar retired, she is no longer seeing a neurologist.      Objective   Physical Exam   Constitutional: She is oriented to person, place, and time. She appears well-developed and well-nourished.   HENT:   Head: Normocephalic and atraumatic.    Eyes: Conjunctivae and EOM are normal. Pupils are equal, round, and reactive to light.   Fundoscopic exam:       The right eye shows no papilledema. The right eye shows venous pulsations.        The left eye shows no papilledema. The left eye shows venous pulsations.   Neck: Carotid bruit is not present.   Neurological: She is oriented to person, place, and time. She has a normal Finger-Nose-Finger Test and a normal Heel to Shin Test. Gait normal.   Reflex Scores:       Tricep reflexes are 2+ on the right side and 2+ on the left side.       Bicep reflexes are 2+ on the right side and 2+ on the left side.       Brachioradialis reflexes are 2+ on the right side and 2+ on the left side.       Patellar reflexes are 2+ on the right side and 2+ on the left side.       Achilles reflexes are 2+ on the right side and 2+ on the left side.  Psychiatric: Her speech is normal.     Neurologic Exam     Mental Status   Oriented to person, place, and time.   Registration of memory: Good recent and remote memory.   Attention: normal. Concentration: normal.   Speech: speech is normal   Level of consciousness: alert  Knowledge: consistent with education.     Cranial Nerves     CN II   Visual fields full to confrontation.   Visual acuity: normal    CN III, IV, VI   Pupils are equal, round, and reactive to light.  Extraocular motions are normal.     CN V   Facial sensation intact.   Right corneal reflex: normal  Left corneal reflex: normal    CN VII   Facial expression full, symmetric.   Right facial weakness: none  Left facial weakness: none    CN VIII   Hearing: intact    CN IX, X   Palate: symmetric    CN XI   Right sternocleidomastoid strength: normal  Left sternocleidomastoid strength: normal    CN XII   Tongue: not atrophic  Tongue deviation: none    Motor Exam   Muscle bulk: normal  Right arm tone: normal  Left arm tone: normal  Right leg tone: normal  Left leg tone: normal    Strength   Strength 5/5 except as noted.     Sensory  Exam   Light touch normal.     Gait, Coordination, and Reflexes     Gait  Gait: normal    Coordination   Finger to nose coordination: normal  Heel to shin coordination: normal    Reflexes   Right brachioradialis: 2+  Left brachioradialis: 2+  Right biceps: 2+  Left biceps: 2+  Right triceps: 2+  Left triceps: 2+  Right patellar: 2+  Left patellar: 2+  Right achilles: 2+  Left achilles: 2+  Right : 2+  Left : 2+      Assessment/Plan   Independent Review of Radiographic Studies:    The shunt x-ray showed that her shunt is set at 110 mmHg. Agree with the report.      Medical Decision Making:    Her daughter will let us know if she would like to proceed with a shuntogram and a shunt series.  My suspicion is that the shunt is working and that her progression of symptoms is related to an underlying separate dementing disease.  That is the more likely scenario.  Nevertheless, if we want to address definitively if the shunt is working, a shuntogram would be necessary.  Again, they will let us know if they would like to proceed.      Diagnoses and all orders for this visit:    Normal pressure hydrocephalus    Abnormality of gait    Encounter for management of cerebrospinal fluid drainage device      Return for The daughter will let us know if they would like to schedule a shuntogram and shunt series.

## 2018-03-27 DIAGNOSIS — E03.9 HYPOTHYROIDISM, UNSPECIFIED TYPE: ICD-10-CM

## 2018-03-27 RX ORDER — LEVOTHYROXINE SODIUM 0.07 MG/1
75 TABLET ORAL DAILY
Qty: 28 TABLET | Refills: 11 | Status: SHIPPED | OUTPATIENT
Start: 2018-03-27

## 2018-03-28 RX ORDER — CHOLECALCIFEROL (VITAMIN D3) 25 MCG
1 TABLET,CHEWABLE ORAL DAILY
Qty: 90 CAPSULE | Refills: 3 | Status: SHIPPED | OUTPATIENT
Start: 2018-03-28

## 2018-04-27 DIAGNOSIS — E11.9 TYPE 2 DIABETES MELLITUS WITHOUT COMPLICATION, UNSPECIFIED LONG TERM INSULIN USE STATUS: ICD-10-CM

## 2018-04-27 DIAGNOSIS — G31.09 OTHER FRONTOTEMPORAL DEMENTIA: ICD-10-CM

## 2018-04-27 DIAGNOSIS — I10 ESSENTIAL HYPERTENSION: ICD-10-CM

## 2018-04-27 DIAGNOSIS — F02.80 OTHER FRONTOTEMPORAL DEMENTIA: ICD-10-CM

## 2018-04-27 DIAGNOSIS — E78.5 HYPERLIPIDEMIA, UNSPECIFIED HYPERLIPIDEMIA TYPE: ICD-10-CM

## 2018-04-27 DIAGNOSIS — R32 URINARY INCONTINENCE, UNSPECIFIED TYPE: ICD-10-CM

## 2018-04-27 RX ORDER — UBIDECARENONE 200 MG
CAPSULE ORAL
Qty: 30 EACH | Refills: 11 | Status: SHIPPED | OUTPATIENT
Start: 2018-04-27

## 2018-04-27 RX ORDER — SOLIFENACIN SUCCINATE 10 MG/1
10 TABLET, FILM COATED ORAL EVERY EVENING
Qty: 30 TABLET | Refills: 11 | Status: SHIPPED | OUTPATIENT
Start: 2018-04-27 | End: 2019-09-16 | Stop reason: HOSPADM

## 2018-04-27 RX ORDER — LOVASTATIN 40 MG/1
TABLET ORAL
Qty: 30 TABLET | Refills: 11 | Status: SHIPPED | OUTPATIENT
Start: 2018-04-27

## 2018-04-27 RX ORDER — LOSARTAN POTASSIUM 25 MG/1
TABLET ORAL
Qty: 60 TABLET | Refills: 11 | Status: SHIPPED | OUTPATIENT
Start: 2018-04-27

## 2018-04-27 RX ORDER — DONEPEZIL HYDROCHLORIDE 5 MG/1
TABLET, FILM COATED ORAL
Qty: 30 TABLET | Refills: 11 | Status: SHIPPED | OUTPATIENT
Start: 2018-04-27

## 2018-09-27 RX ORDER — CHLORAL HYDRATE 500 MG
1 CAPSULE ORAL DAILY
Qty: 90 CAPSULE | Refills: 1 | Status: SHIPPED | OUTPATIENT
Start: 2018-09-27

## 2018-11-18 ENCOUNTER — LAB REQUISITION (OUTPATIENT)
Dept: LAB | Facility: HOSPITAL | Age: 78
End: 2018-11-18

## 2018-11-18 DIAGNOSIS — Z00.00 ROUTINE GENERAL MEDICAL EXAMINATION AT A HEALTH CARE FACILITY: ICD-10-CM

## 2018-11-18 LAB
BACTERIA UR QL AUTO: NORMAL /HPF
BILIRUB UR QL STRIP: NEGATIVE
CLARITY UR: ABNORMAL
COLOR UR: YELLOW
GLUCOSE UR STRIP-MCNC: NEGATIVE MG/DL
HGB UR QL STRIP.AUTO: NEGATIVE
HYALINE CASTS UR QL AUTO: NORMAL /LPF
KETONES UR QL STRIP: NEGATIVE
LEUKOCYTE ESTERASE UR QL STRIP.AUTO: ABNORMAL
NITRITE UR QL STRIP: NEGATIVE
PH UR STRIP.AUTO: 5.5 [PH] (ref 5–8)
PROT UR QL STRIP: NEGATIVE
RBC # UR: NORMAL /HPF
REF LAB TEST METHOD: NORMAL
SP GR UR STRIP: 1.02 (ref 1–1.03)
SQUAMOUS #/AREA URNS HPF: NORMAL /HPF
UROBILINOGEN UR QL STRIP: ABNORMAL
WBC UR QL AUTO: NORMAL /HPF

## 2018-11-18 PROCEDURE — 81001 URINALYSIS AUTO W/SCOPE: CPT

## 2019-09-11 NOTE — PROGRESS NOTES
Subjective   Patient ID: Rissa Hodges is a 78 y.o. female is here today for follow-up with new XR.  She seen last in the office January 2018 for hydrocephalus.  She is here with her daughter.  She states she's had multiple falls. She is at a Rehab facility Nurse practitioner thinks it may be her shunt.  She also complains of lower back pain. She takes Ibuprofen 400 mg prn for pain.     Fall   The fall occurred in unknown circumstances. There was no blood loss. The point of impact was the buttocks. The pain is present in the back. The pain is at a severity of 5/10. The pain is moderate. Pertinent negatives include no bowel incontinence.   Back Pain   This is a new problem. The current episode started more than 1 month ago. The problem occurs constantly. The problem is unchanged. The pain is present in the lumbar spine. The quality of the pain is described as aching. The pain does not radiate. The pain is at a severity of 8/10. The pain is severe. The pain is the same all the time. The symptoms are aggravated by bending (sitting up ). Associated symptoms include bladder incontinence and weakness (leg weakness ). Pertinent negatives include no bowel incontinence, leg pain or perianal numbness.   Difficulty Walking   This is a chronic problem. The problem occurs constantly. The problem has been gradually worsening. Associated symptoms include weakness (leg weakness ).       The following portions of the patient's history were reviewed and updated as appropriate: allergies, current medications, past family history, past medical history, past social history, past surgical history and problem list.    Review of Systems   Gastrointestinal: Negative for bowel incontinence.   Genitourinary: Positive for bladder incontinence.   Musculoskeletal: Positive for back pain.   Neurological: Positive for weakness (leg weakness ).   Psychiatric/Behavioral: Positive for confusion and decreased concentration.   All other systems  reviewed and are negative.      Objective   Physical Exam   Constitutional: She appears well-developed and well-nourished.   HENT:   Head: Normocephalic and atraumatic.   Right Ear: External ear normal.   Left Ear: External ear normal.   Eyes: Conjunctivae and EOM are normal. Pupils are equal, round, and reactive to light. Right eye exhibits no discharge. Left eye exhibits no discharge.   Neck: Normal range of motion. Neck supple. No tracheal deviation present.   Pulmonary/Chest: Effort normal. No stridor. No respiratory distress.   Musculoskeletal: Normal range of motion. She exhibits no edema, tenderness or deformity.   Neurological: She is alert. She has normal strength and normal reflexes. She displays no atrophy, no tremor and normal reflexes. No cranial nerve deficit or sensory deficit. She exhibits normal muscle tone. She displays no seizure activity.   No long tract signs    Shunt reservoir does pump and refill well   Skin: Skin is warm and dry.   Psychiatric: She has a normal mood and affect. Her behavior is normal. Judgment and thought content normal.   Nursing note and vitals reviewed.    Neurologic Exam     Cranial Nerves     CN III, IV, VI   Pupils are equal, round, and reactive to light.  Extraocular motions are normal.     Motor Exam     Strength   Strength 5/5 throughout.       Assessment/Plan   Independent Review of Radiographic Studies:    I did review x-rays that were completed at the nursing home.  They did not reveal any acute fractures in the pelvis or thoracolumbar spine.  Medical Decision Making:    Ms. Hodges had a R frontal  shunt placed years ago in [place]. It is a Codman shunt. Last shunt XR showed that it was set at 110mm of water. Her daughter states that when she got the shunt they did notice improvement in her gait.  She has a hx of dementia and has significant memory issues and urinary incontinence. These symptoms did not improve much.  Last time the patient and her daughter was  here the daughter had some concerns about some progressive changes in her cognition and sleep patterns. Dr. Grimes thought they were likely more related to her dementia but did discuss the possibility of a shuntogram.  That was never completed.  The daughter brought Ms. Hodges back today for 2 reasons.  She does feel that her gait is getting worse as the patient has fallen quite a few times in the last 2 months.  Most of these falls occur first thing in the morning when she gets out of bed.  Her legs just collapsed.  She admits that she is very sedentary.  The other issue is back pain that began after these falls.  The pain does not radiate into the legs nor is it associated with any change in bowel or bladder function or numbness or tingling.    Her shunt reservoir does pump and refill well.  She does not have any focal deficits on exam.  She is tender to palpation in the low lumbar region.    In regards to the shunt I did check the shunt setting today in the office.  It is still at 110 mm of water.  I again explained to the patient and her daughter that her gait issues are likely multifactorial (dementia, peripheral neuropathy, deconditioning secondary to sedentary lifestyle, NPH)  but the only way for us to confirm whether the shunt is working properly would be to get a shuntogram and shunt series.    In regards to the low back pain in order to rule out compression fracture (which she has had before) I suggested a lumbar spine MRI with post MRI shunt x-ray with fluoroscopy.    They do wish to proceed with the aforementioned testing and will follow-up thereafter.  Rissa was seen today for back pain.    Diagnoses and all orders for this visit:    Normal pressure hydrocephalus  -     MRI Lumbar Spine Without Contrast; Future  -     FL shunt series programable; Future  -     XR shunt series; Future  -     NM csf shunt evaluation; Future    Osteopenia of lumbar spine  -     MRI Lumbar Spine Without Contrast;  Future  -     FL shunt series programable; Future    Acute midline low back pain without sciatica  -     MRI Lumbar Spine Without Contrast; Future  -     FL shunt series programable; Future    Diabetic peripheral neuropathy (CMS/HCC)  -     MRI Lumbar Spine Without Contrast; Future  -     FL shunt series programable; Future      Return for follow up after radiology test with Dr. Grimes.

## 2019-09-16 ENCOUNTER — OFFICE VISIT (OUTPATIENT)
Dept: NEUROSURGERY | Facility: CLINIC | Age: 79
End: 2019-09-16

## 2019-09-16 VITALS
HEART RATE: 53 BPM | HEIGHT: 63 IN | SYSTOLIC BLOOD PRESSURE: 136 MMHG | DIASTOLIC BLOOD PRESSURE: 53 MMHG | BODY MASS INDEX: 22.86 KG/M2

## 2019-09-16 DIAGNOSIS — E11.42 DIABETIC PERIPHERAL NEUROPATHY (HCC): ICD-10-CM

## 2019-09-16 DIAGNOSIS — M54.50 ACUTE MIDLINE LOW BACK PAIN WITHOUT SCIATICA: ICD-10-CM

## 2019-09-16 DIAGNOSIS — M85.88 OSTEOPENIA OF LUMBAR SPINE: ICD-10-CM

## 2019-09-16 DIAGNOSIS — G91.2 NORMAL PRESSURE HYDROCEPHALUS (HCC): Primary | ICD-10-CM

## 2019-09-16 PROCEDURE — 99214 OFFICE O/P EST MOD 30 MIN: CPT | Performed by: PHYSICIAN ASSISTANT

## 2019-09-16 RX ORDER — TIMOLOL MALEATE 5 MG/ML
SOLUTION/ DROPS OPHTHALMIC
COMMUNITY
Start: 2019-07-15 | End: 2019-09-16 | Stop reason: SDUPTHER

## 2019-09-16 RX ORDER — METFORMIN HYDROCHLORIDE 500 MG/1
TABLET, EXTENDED RELEASE ORAL
COMMUNITY
Start: 2019-08-04

## 2019-09-16 RX ORDER — IBUPROFEN 400 MG/1
TABLET ORAL
COMMUNITY
Start: 2019-09-04

## 2019-09-17 ENCOUNTER — TRANSCRIBE ORDERS (OUTPATIENT)
Dept: NEUROSURGERY | Facility: CLINIC | Age: 79
End: 2019-09-17

## 2019-09-20 ENCOUNTER — TELEPHONE (OUTPATIENT)
Dept: NEUROSURGERY | Facility: CLINIC | Age: 79
End: 2019-09-20

## 2019-09-20 NOTE — TELEPHONE ENCOUNTER
Left message on Mary's voicemail regarding needing an op report from a Codman shunt that was put in on 7-.  We are also needing a model and serial number for the shunt.

## 2019-09-20 NOTE — TELEPHONE ENCOUNTER
Left message on Ham's voicemail regarding needing an op report from a Codman shunt that was placed on 7-16-08.  We are also needing a model and serial number for the shunt.

## 2019-09-25 ENCOUNTER — TELEPHONE (OUTPATIENT)
Dept: NEUROSURGERY | Facility: CLINIC | Age: 79
End: 2019-09-25

## 2019-09-25 NOTE — TELEPHONE ENCOUNTER
Since starting this note we received the information on the pt's shunt. Clau is getting everything set up.

## 2019-09-25 NOTE — TELEPHONE ENCOUNTER
"Pt's dtr Josie called to see where we stand on her MRI. Clau is trying to get the information on the Codman shunt that pt has. Clau has called MD office multiple times. The MD did fax info but it was too small to read. Clau left another message to get a better copy.    In the meantime since the pt's visit on 9/16, she has fallen at least 4 more times. Pt is in extreme pain. Josie wants to know if we can order xrays to be done outside of the facility where she is located. Apparently the last ones were done by a \"[employer]\" and were of poor quality.    The dtr is very concerned and would like to hear back from us soon.    "

## 2019-09-27 ENCOUNTER — TELEPHONE (OUTPATIENT)
Dept: NEUROSURGERY | Facility: CLINIC | Age: 79
End: 2019-09-27

## 2019-09-27 DIAGNOSIS — M54.50 ACUTE MIDLINE LOW BACK PAIN WITHOUT SCIATICA: ICD-10-CM

## 2019-09-27 DIAGNOSIS — R26.9 ABNORMALITY OF GAIT: ICD-10-CM

## 2019-09-27 DIAGNOSIS — M85.88 OSTEOPENIA OF LUMBAR SPINE: Primary | ICD-10-CM

## 2019-09-27 NOTE — TELEPHONE ENCOUNTER
Pt's dtr called to say that she found an old brace and pt put it on a half hour ago. She isn't sure it is helping yet. They want to know if we could please just order xrays until we get the shunt info.

## 2019-09-27 NOTE — TELEPHONE ENCOUNTER
For the back pain?      If radiology is being a pain then just cancel the MRI and do L spine CT without contrast and total body bone scan.  That will offer all the info we need for the back pain. She still needs the shuntogram for the gait issues if they want to know if the shunt is working.

## 2019-09-30 ENCOUNTER — HOSPITAL ENCOUNTER (OUTPATIENT)
Dept: CT IMAGING | Facility: HOSPITAL | Age: 79
Discharge: HOME | End: 2019-09-30
Admitting: PHYSICIAN ASSISTANT

## 2019-09-30 DIAGNOSIS — M85.88 OSTEOPENIA OF LUMBAR SPINE: ICD-10-CM

## 2019-09-30 DIAGNOSIS — M54.50 ACUTE MIDLINE LOW BACK PAIN WITHOUT SCIATICA: ICD-10-CM

## 2019-09-30 DIAGNOSIS — R26.9 ABNORMALITY OF GAIT: ICD-10-CM

## 2019-09-30 PROCEDURE — 72131 CT LUMBAR SPINE W/O DYE: CPT

## 2019-10-07 ENCOUNTER — HOSPITAL ENCOUNTER (OUTPATIENT)
Dept: NUCLEAR MEDICINE | Facility: HOSPITAL | Age: 79
Discharge: HOME | End: 2019-10-07

## 2019-10-07 DIAGNOSIS — M54.50 ACUTE MIDLINE LOW BACK PAIN WITHOUT SCIATICA: ICD-10-CM

## 2019-10-07 DIAGNOSIS — R26.9 ABNORMALITY OF GAIT: ICD-10-CM

## 2019-10-07 DIAGNOSIS — M85.88 OSTEOPENIA OF LUMBAR SPINE: ICD-10-CM

## 2019-10-07 PROCEDURE — 0 TECHNETIUM MEDRONATE KIT: Performed by: PHYSICIAN ASSISTANT

## 2019-10-07 PROCEDURE — A9503 TC99M MEDRONATE: HCPCS | Performed by: PHYSICIAN ASSISTANT

## 2019-10-07 PROCEDURE — 78306 BONE IMAGING WHOLE BODY: CPT

## 2019-10-07 RX ORDER — TC 99M MEDRONATE 20 MG/10ML
21.4 INJECTION, POWDER, LYOPHILIZED, FOR SOLUTION INTRAVENOUS
Status: COMPLETED | OUTPATIENT
Start: 2019-10-07 | End: 2019-10-07

## 2019-10-07 RX ADMIN — Medication 21.4 MILLICURIE: at 09:20

## 2019-10-10 ENCOUNTER — APPOINTMENT (OUTPATIENT)
Dept: GENERAL RADIOLOGY | Facility: HOSPITAL | Age: 79
End: 2019-10-10

## 2019-10-10 ENCOUNTER — APPOINTMENT (OUTPATIENT)
Dept: NUCLEAR MEDICINE | Facility: HOSPITAL | Age: 79
End: 2019-10-10

## 2019-10-16 ENCOUNTER — HOSPITAL ENCOUNTER (OUTPATIENT)
Dept: NUCLEAR MEDICINE | Facility: HOSPITAL | Age: 79
Discharge: HOME | End: 2019-10-16

## 2019-10-16 ENCOUNTER — HOSPITAL ENCOUNTER (OUTPATIENT)
Dept: GENERAL RADIOLOGY | Facility: HOSPITAL | Age: 79
Discharge: HOME | End: 2019-10-16

## 2019-10-16 ENCOUNTER — HOSPITAL ENCOUNTER (OUTPATIENT)
Dept: GENERAL RADIOLOGY | Facility: HOSPITAL | Age: 79
Discharge: HOME | End: 2019-10-16
Admitting: PHYSICIAN ASSISTANT

## 2019-10-16 DIAGNOSIS — E11.42 DIABETIC PERIPHERAL NEUROPATHY (HCC): ICD-10-CM

## 2019-10-16 DIAGNOSIS — M85.88 OSTEOPENIA OF LUMBAR SPINE: ICD-10-CM

## 2019-10-16 DIAGNOSIS — M54.50 ACUTE MIDLINE LOW BACK PAIN WITHOUT SCIATICA: ICD-10-CM

## 2019-10-16 DIAGNOSIS — G91.2 NORMAL PRESSURE HYDROCEPHALUS (HCC): ICD-10-CM

## 2019-10-16 PROCEDURE — 78645 CSF SHUNT EVALUATION: CPT

## 2019-10-16 PROCEDURE — 76000 FLUOROSCOPY <1 HR PHYS/QHP: CPT

## 2019-10-16 PROCEDURE — 71046 X-RAY EXAM CHEST 2 VIEWS: CPT

## 2019-10-16 PROCEDURE — 74018 RADEX ABDOMEN 1 VIEW: CPT

## 2019-10-16 PROCEDURE — 70250 X-RAY EXAM OF SKULL: CPT

## 2019-10-16 PROCEDURE — 0 TECHNETIUM TC 99M PENTETATE KIT: Performed by: PHYSICIAN ASSISTANT

## 2019-10-16 PROCEDURE — A9539 TC99M PENTETATE: HCPCS | Performed by: PHYSICIAN ASSISTANT

## 2019-10-16 RX ADMIN — KIT FOR THE PREPARATION OF TECHNETIUM TC 99M PENTETATE 1 DOSE: 20 INJECTION, POWDER, LYOPHILIZED, FOR SOLUTION INTRAVENOUS; RESPIRATORY (INHALATION) at 12:11

## 2019-10-21 ENCOUNTER — TELEPHONE (OUTPATIENT)
Dept: NEUROSURGERY | Facility: CLINIC | Age: 79
End: 2019-10-21

## 2019-10-21 NOTE — TELEPHONE ENCOUNTER
Spoke with patient's daughter and informed her that the radiologist has read her imaging and she should keep her appt for Friday

## 2019-10-21 NOTE — TELEPHONE ENCOUNTER
Pt's daughter Erin Geronimo is calling because pt got test done.She says the tech said that the test may not be readable.  She wants to know if we got the results and if they are readable. She does not want to come in Friday if you do not have the results    Erin [phone]    Please leave message

## 2019-10-25 ENCOUNTER — OFFICE VISIT (OUTPATIENT)
Dept: NEUROSURGERY | Facility: CLINIC | Age: 79
End: 2019-10-25

## 2019-10-25 VITALS
HEART RATE: 74 BPM | SYSTOLIC BLOOD PRESSURE: 138 MMHG | DIASTOLIC BLOOD PRESSURE: 78 MMHG | BODY MASS INDEX: 22.86 KG/M2 | HEIGHT: 63 IN

## 2019-10-25 DIAGNOSIS — Z45.41 ENCOUNTER FOR MANAGEMENT OF CEREBROSPINAL FLUID DRAINAGE DEVICE: ICD-10-CM

## 2019-10-25 DIAGNOSIS — G91.2 NORMAL PRESSURE HYDROCEPHALUS (HCC): ICD-10-CM

## 2019-10-25 DIAGNOSIS — M80.08XD CRUSH FRACTURE OF VERTEBRA DUE TO OSTEOPOROSIS WITH ROUTINE HEALING, SUBSEQUENT ENCOUNTER: Primary | ICD-10-CM

## 2019-10-25 PROCEDURE — 99214 OFFICE O/P EST MOD 30 MIN: CPT | Performed by: NEUROLOGICAL SURGERY

## 2019-10-25 RX ORDER — TRAMADOL HYDROCHLORIDE 50 MG/1
50 TABLET ORAL EVERY 6 HOURS PRN
COMMUNITY

## 2019-12-26 ENCOUNTER — APPOINTMENT (OUTPATIENT)
Dept: CT IMAGING | Facility: HOSPITAL | Age: 79
End: 2019-12-26

## 2019-12-26 ENCOUNTER — HOSPITAL ENCOUNTER (EMERGENCY)
Facility: HOSPITAL | Age: 79
Discharge: HOME | End: 2019-12-26
Attending: EMERGENCY MEDICINE | Admitting: EMERGENCY MEDICINE

## 2019-12-26 VITALS
TEMPERATURE: 97.5 F | DIASTOLIC BLOOD PRESSURE: 63 MMHG | BODY MASS INDEX: 20.71 KG/M2 | HEIGHT: 63 IN | WEIGHT: 116.9 LBS | SYSTOLIC BLOOD PRESSURE: 121 MMHG | OXYGEN SATURATION: 92 % | RESPIRATION RATE: 18 BRPM | HEART RATE: 56 BPM

## 2019-12-26 DIAGNOSIS — F03.91 DEMENTIA WITH BEHAVIORAL DISTURBANCE, UNSPECIFIED DEMENTIA TYPE: Primary | ICD-10-CM

## 2019-12-26 LAB
ANION GAP SERPL CALCULATED.3IONS-SCNC: 10.9 MMOL/L (ref 5–15)
BASOPHILS # BLD AUTO: 0.08 10*3/MM3 (ref 0–0.2)
BASOPHILS NFR BLD AUTO: 1.1 % (ref 0–1.5)
BILIRUB UR QL STRIP: NEGATIVE
BUN BLD-MCNC: 20 MG/DL (ref 8–23)
BUN/CREAT SERPL: 35.7 (ref 7–25)
CALCIUM SPEC-SCNC: 8.6 MG/DL (ref 8.6–10.5)
CHLORIDE SERPL-SCNC: 101 MMOL/L (ref 98–107)
CLARITY UR: CLEAR
CO2 SERPL-SCNC: 24.1 MMOL/L (ref 22–29)
COLOR UR: YELLOW
CREAT BLD-MCNC: 0.56 MG/DL (ref 0.57–1)
DEPRECATED RDW RBC AUTO: 39.5 FL (ref 37–54)
EOSINOPHIL # BLD AUTO: 1.06 10*3/MM3 (ref 0–0.4)
EOSINOPHIL NFR BLD AUTO: 14.2 % (ref 0.3–6.2)
ERYTHROCYTE [DISTWIDTH] IN BLOOD BY AUTOMATED COUNT: 11.9 % (ref 12.3–15.4)
GFR SERPL CREATININE-BSD FRML MDRD: 104 ML/MIN/1.73
GLUCOSE BLD-MCNC: 140 MG/DL (ref 65–99)
GLUCOSE UR STRIP-MCNC: NEGATIVE MG/DL
HCT VFR BLD AUTO: 38.1 % (ref 34–46.6)
HGB BLD-MCNC: 13.2 G/DL (ref 12–15.9)
HGB UR QL STRIP.AUTO: NEGATIVE
IMM GRANULOCYTES # BLD AUTO: 0.02 10*3/MM3 (ref 0–0.05)
IMM GRANULOCYTES NFR BLD AUTO: 0.3 % (ref 0–0.5)
KETONES UR QL STRIP: ABNORMAL
LEUKOCYTE ESTERASE UR QL STRIP.AUTO: NEGATIVE
LYMPHOCYTES # BLD AUTO: 1.32 10*3/MM3 (ref 0.7–3.1)
LYMPHOCYTES NFR BLD AUTO: 17.7 % (ref 19.6–45.3)
MAGNESIUM SERPL-MCNC: 1.5 MG/DL (ref 1.6–2.4)
MCH RBC QN AUTO: 31.7 PG (ref 26.6–33)
MCHC RBC AUTO-ENTMCNC: 34.6 G/DL (ref 31.5–35.7)
MCV RBC AUTO: 91.4 FL (ref 79–97)
MONOCYTES # BLD AUTO: 0.64 10*3/MM3 (ref 0.1–0.9)
MONOCYTES NFR BLD AUTO: 8.6 % (ref 5–12)
NEUTROPHILS # BLD AUTO: 4.33 10*3/MM3 (ref 1.7–7)
NEUTROPHILS NFR BLD AUTO: 58.1 % (ref 42.7–76)
NITRITE UR QL STRIP: NEGATIVE
NRBC BLD AUTO-RTO: 0 /100 WBC (ref 0–0.2)
PH UR STRIP.AUTO: 6.5 [PH] (ref 5–8)
PLATELET # BLD AUTO: 241 10*3/MM3 (ref 140–450)
PMV BLD AUTO: 10.2 FL (ref 6–12)
POTASSIUM BLD-SCNC: 4.6 MMOL/L (ref 3.5–5.2)
PROT UR QL STRIP: NEGATIVE
RBC # BLD AUTO: 4.17 10*6/MM3 (ref 3.77–5.28)
SODIUM BLD-SCNC: 136 MMOL/L (ref 136–145)
SP GR UR STRIP: 1.02 (ref 1–1.03)
UROBILINOGEN UR QL STRIP: ABNORMAL
WBC NRBC COR # BLD: 7.45 10*3/MM3 (ref 3.4–10.8)

## 2019-12-26 PROCEDURE — 93005 ELECTROCARDIOGRAM TRACING: CPT | Performed by: EMERGENCY MEDICINE

## 2019-12-26 PROCEDURE — 81003 URINALYSIS AUTO W/O SCOPE: CPT | Performed by: EMERGENCY MEDICINE

## 2019-12-26 PROCEDURE — 96365 THER/PROPH/DIAG IV INF INIT: CPT

## 2019-12-26 PROCEDURE — 93010 ELECTROCARDIOGRAM REPORT: CPT | Performed by: INTERNAL MEDICINE

## 2019-12-26 PROCEDURE — 36415 COLL VENOUS BLD VENIPUNCTURE: CPT

## 2019-12-26 PROCEDURE — 70450 CT HEAD/BRAIN W/O DYE: CPT

## 2019-12-26 PROCEDURE — 99284 EMERGENCY DEPT VISIT MOD MDM: CPT

## 2019-12-26 PROCEDURE — 85025 COMPLETE CBC W/AUTO DIFF WBC: CPT | Performed by: EMERGENCY MEDICINE

## 2019-12-26 PROCEDURE — 96366 THER/PROPH/DIAG IV INF ADDON: CPT

## 2019-12-26 PROCEDURE — 25010000002 MAGNESIUM SULFATE 2 GM/50ML SOLUTION: Performed by: EMERGENCY MEDICINE

## 2019-12-26 PROCEDURE — 83735 ASSAY OF MAGNESIUM: CPT | Performed by: EMERGENCY MEDICINE

## 2019-12-26 PROCEDURE — 80048 BASIC METABOLIC PNL TOTAL CA: CPT | Performed by: EMERGENCY MEDICINE

## 2019-12-26 PROCEDURE — P9612 CATHETERIZE FOR URINE SPEC: HCPCS

## 2019-12-26 RX ORDER — MAGNESIUM SULFATE HEPTAHYDRATE 40 MG/ML
2 INJECTION, SOLUTION INTRAVENOUS ONCE
Status: COMPLETED | OUTPATIENT
Start: 2019-12-26 | End: 2019-12-26

## 2019-12-26 RX ORDER — SODIUM CHLORIDE 0.9 % (FLUSH) 0.9 %
10 SYRINGE (ML) INJECTION AS NEEDED
Status: DISCONTINUED | OUTPATIENT
Start: 2019-12-26 | End: 2019-12-26 | Stop reason: HOSPADM

## 2019-12-26 RX ADMIN — MAGNESIUM SULFATE 2 G: 2 INJECTION INTRAVENOUS at 19:24

## 2019-12-26 NOTE — ED PROVIDER NOTES
EMERGENCY DEPARTMENT ENCOUNTER    CHIEF COMPLAINT  Chief Complaint: AMS  History given by: NH Report  History limited by: Dementia  Room Number: 32/32  PMD: Sacha Don Jr., MD (Inactive)      HPI:  Pt is a 79 y.o. female with hx of DM, HLD, HTN, and Dementia presents with reported AMS per the NH report. Pt says she does not have any CP and is breathing okay. Pt's daughter is at the bedside ans states the pt normally ambulates with a wheelchair now s/t having a compression fx on Labor Day of this year. Per the daughter, Heidi Stanford called and states that the pt wasn't very responsive and called EMS. She states the pt has been getting bilateral feet swelling and has been declining a lot faster recently. Pt is on Tramadol PRN but as the pt has begun to forget to ask for the medication.  After the patient's daughter arrived.  Patient was talking to me following commands moving all extremities.  She denied any complaints.    Pt's DNR is at the bedside.     Duration:  unknown  Onset: unable to assess  Timing: unable to assess  Location: unable to assess  Radiation: unable to assess  Quality: unable to assess  Intensity/Severity: unable to assess  Progression: unable to assess  Associated Symptoms: unable to assess  Aggravating Factors: unable to assess  Alleviating Factors: unable to assess      PAST MEDICAL HISTORY  Active Ambulatory Problems     Diagnosis Date Noted   • Migraine with aura 03/08/2016   • Crush fracture of vertebra due to osteoporosis (CMS/[facility]) 03/08/2016   • Type 2 diabetes mellitus (CMS/[facility]) 03/08/2016   • Diabetic peripheral neuropathy (CMS/[facility]) 03/08/2016   • Frontotemporal dementia (CMS/[facility]) 03/08/2016   • Hyperlipidemia 03/08/2016   • Hypertension 03/08/2016   • Syndrome of inappropriate secretion of antidiuretic hormone (CMS/[facility]) 03/08/2016   • Degeneration of intervertebral disc of lumbar region 03/08/2016   • Non-toxic multinodular goiter 03/08/2016   • Normal pressure hydrocephalus  (CMS/[facility]) 03/08/2016   • Osteopenia 03/08/2016   • Hypothyroidism 03/08/2016   • Urinary incontinence 03/08/2016   • Vitamin D deficiency 03/08/2016   • Abnormality of gait 12/22/2017   • Encounter for management of cerebrospinal fluid drainage device 01/17/2018   • Acute midline low back pain without sciatica 09/16/2019     Resolved Ambulatory Problems     Diagnosis Date Noted   • Diabetes mellitus type 2, uncontrolled (CMS/[facility]) 03/08/2016   • Hypersomnolence 09/07/2016     Past Medical History:   Diagnosis Date   • Cognitive dysfunction    • Confusion    • Dementia (CMS/[facility])    • Diabetes mellitus with neurological manifestations, uncontrolled (CMS/[facility])    • Hearing loss of both ears    • HLD (hyperlipidemia)    • HTN (hypertension)    • Hydrocephalus (CMS/[facility])    • Hyponatremia    • Sinusitis    • Uncontrolled type 2 diabetes mellitus with complication (CMS/[facility])        PAST SURGICAL HISTORY  Past Surgical History:   Procedure Laterality Date   • BACK SURGERY     • CSF SHUNT  07/2008    for NPH       FAMILY HISTORY  Family History   Problem Relation Age of Onset   • Coronary artery disease Mother    • Cancer Sister    • Diabetes Other    • Neuropathy Other         peripheral       SOCIAL HISTORY  Social History     Socioeconomic History   • Marital status: Single     Spouse name: Not on file   • Number of children: 2   • Years of education: COLLEGE   • Highest education level: Not on file   Occupational History   • Occupation: RETIRED    Tobacco Use   • Smoking status: Never Smoker   • Smokeless tobacco: Never Used   Substance and Sexual Activity   • Alcohol use: No   • Drug use: No   • Sexual activity: Defer       ALLERGIES  Acetaminophen and Tetracyclines & related    REVIEW OF SYSTEMS  Review of Systems   Unable to perform ROS: Dementia   Respiratory: Negative for shortness of breath.    Cardiovascular: Negative for chest pain.   Neurological:        + AMS         PHYSICAL EXAM  ED Triage Vitals   Temp Heart  Rate Resp BP SpO2   12/26/19 1721 12/26/19 1719 12/26/19 1719 12/26/19 1719 12/26/19 1719   97.5 °F (36.4 °C) 60 18 144/54 98 %      Temp src Heart Rate Source Patient Position BP Location FiO2 (%)   12/26/19 1721 -- -- -- --   Tympanic           Physical Exam   Constitutional: No distress.   Pt is elderly and frail. Lying in the bed   HENT:   Head: Normocephalic and atraumatic.   Mouth/Throat: Oropharynx is clear and moist and mucous membranes are normal.   Eyes: Pupils are equal, round, and reactive to light. EOM are normal.   Neck: Normal range of motion. Neck supple.   Cardiovascular: Normal rate, regular rhythm and normal heart sounds.   Pulmonary/Chest: Effort normal and breath sounds normal. No respiratory distress.   Abdominal: Soft.   Musculoskeletal: Normal range of motion. She exhibits no edema.   Pt is able to move all extremities. BLE are weaker than the BUE   Neurological: She is alert. She has normal sensation and intact cranial nerves.   Initially the pt would not speak but this seemed to be volitional. Once the pt's daughter entered the room the pt then began to speak with me.   Skin: Skin is warm and dry. No rash noted.   Nursing note and vitals reviewed.        LAB RESULTS  Lab Results (last 24 hours)     Procedure Component Value Units Date/Time    CBC & Differential [[id number]] Collected:  12/26/19 1815    Specimen:  Blood Updated:  12/26/19 1833    Narrative:       The following orders were created for panel order CBC & Differential.  Procedure                               Abnormality         Status                     ---------                               -----------         ------                     CBC Auto Differential[[id number]]        Abnormal            Final result                 Please view results for these tests on the individual orders.    Basic Metabolic Panel [[id number]]  (Abnormal) Collected:  12/26/19 1815    Specimen:  Blood Updated:  12/26/19 1849     Glucose 140 mg/dL       BUN 20 mg/dL      Creatinine 0.56 mg/dL      Sodium 136 mmol/L      Potassium 4.6 mmol/L      Chloride 101 mmol/L      CO2 24.1 mmol/L      Calcium 8.6 mg/dL      eGFR Non African Amer 104 mL/min/1.73      BUN/Creatinine Ratio 35.7     Anion Gap 10.9 mmol/L     Narrative:       GFR Normal >60  Chronic Kidney Disease <60  Kidney Failure <15      Magnesium [[id number]]  (Abnormal) Collected:  12/26/19 1815    Specimen:  Blood Updated:  12/26/19 1849     Magnesium 1.5 mg/dL     CBC Auto Differential [[id number]]  (Abnormal) Collected:  12/26/19 1815    Specimen:  Blood Updated:  12/26/19 1833     WBC 7.45 10*3/mm3      RBC 4.17 10*6/mm3      Hemoglobin 13.2 g/dL      Hematocrit 38.1 %      MCV 91.4 fL      MCH 31.7 pg      MCHC 34.6 g/dL      RDW 11.9 %      RDW-SD 39.5 fl      MPV 10.2 fL      Platelets 241 10*3/mm3      Neutrophil % 58.1 %      Lymphocyte % 17.7 %      Monocyte % 8.6 %      Eosinophil % 14.2 %      Basophil % 1.1 %      Immature Grans % 0.3 %      Neutrophils, Absolute 4.33 10*3/mm3      Lymphocytes, Absolute 1.32 10*3/mm3      Monocytes, Absolute 0.64 10*3/mm3      Eosinophils, Absolute 1.06 10*3/mm3      Basophils, Absolute 0.08 10*3/mm3      Immature Grans, Absolute 0.02 10*3/mm3      nRBC 0.0 /100 WBC     Urinalysis With Microscopic If Indicated (No Culture) - Urine, Catheter [[id number]]  (Abnormal) Collected:  12/26/19 1924    Specimen:  Urine, Catheter Updated:  12/26/19 1946     Color, UA Yellow     Appearance, UA Clear     pH, UA 6.5     Specific Gravity, UA 1.020     Glucose, UA Negative     Ketones, UA Trace     Bilirubin, UA Negative     Blood, UA Negative     Protein, UA Negative     Leuk Esterase, UA Negative     Nitrite, UA Negative     Urobilinogen, UA 0.2 E.U./dL    Narrative:       Urine microscopic not indicated.          I ordered the above labs and reviewed the results    RADIOLOGY  Ct Head Without Contrast    Result Date: 12/26/2019  CT HEAD WO CONTRAST-  CLINICAL HISTORY:  Confusion. Dementia.  TECHNIQUE: Transverse 3 mm thick images were obtained from the base of the skull to the vertex without IV contrast.  Radiation dose reduction techniques were utilized, including automated exposure control and exposure modulation based on body size.  COMPARISON: CT head dated 11/13/2017.  FINDINGS: There is mild cortical atrophy. There is moderate dilatation of the lateral and third ventricles that is likely due to central atrophy. These findings are unchanged. There is no evidence of acute infarct or hemorrhage. There is ill-defined diminished attenuation in the white matter of both cerebral hemispheres that appears similar on the previous CT scan. This is consistent with sequela of moderate small vessel chronic ischemic change. A ventriculoperitoneal shunt catheter enters through the right frontal bone and has its tip in the frontal horn of the right lateral ventricle. This is unchanged.      Diffuse cortical and central atrophy. Evidence of moderate small vessel chronic ischemic change as described. No acute  abnormality is identified.  This report was finalized on 12/26/2019 7:56 PM by Dr. Ramo Ferguson M.D.        I ordered the above noted radiological studies. Interpreted by radiologist. Reviewed by me in PACS.       PROCEDURES  Procedures      PROGRESS AND CONSULTS  ED Course as of Dec 26 2015   Thu Dec 26, 2019   1958 I spoke with the radiologist Dr. Ferguson about the results of the CT of the head.  There is no acute process seen.  Chronic changes and no change from previous CT scan.    [MM]   2013 I spoke with the daughter at length informed the results of the CT scan and lab work.  She agrees with the plan.  She is going to come and  from mother and take her back to [place].  All questions were answered.    [MM]      ED Course User Index  [MM] Timothy Sanz MD     2741- Rechecked pt who is resting comfortably in NAD. Due to the DNR, discussed the plan of workup with the pt's  daughter for a CT Head and lab work. Pt's daughter is agreeable with this plan. All questions were answered at this time.     I reevaluated the patient after all the results of the test.  She will easily arouse tells me her name.  She is hemodynamically stable with normal vital signs.  I think she is good to go home.  I will talk with the patient's daughter.    MEDICATIONS GIVEN IN ER  Medications   sodium chloride 0.9 % flush 10 mL (has no administration in time range)   magnesium sulfate 2g/50 mL (PREMIX) infusion (2 g Intravenous New Bag 12/26/19 1924)         MEDICAL DECISION MAKING  Results were reviewed/discussed with the patient and they were also made aware of online access. Pt also made aware that some labs, such as cultures, will not be resulted during ER visit and follow up with PMD is necessary.     MDM       DIAGNOSIS  Final diagnoses:   Dementia with behavioral disturbance, unspecified dementia type (CMS/[facility])       DISPOSITION  DISCHARGE    Patient discharged in stable condition.    Reviewed implications of results, diagnosis, meds, responsibility to follow up, warning signs and symptoms of possible worsening, potential complications and reasons to return to ER.    Patient/Family voiced understanding of above instructions.    Discussed plan for discharge, as there is no emergent indication for admission. Patient referred to primary care provider for BP management due to today's BP. Pt/family is agreeable and understands need for follow up and repeat testing.  Pt is aware that discharge does not mean that nothing is wrong but it indicates no emergency is present that requires admission and they must continue care with follow-up as given below or physician of their choice.     FOLLOW-UP  Damari Callejas, APRN  [id number] [facility] [address]  [facility] [address] [phone]    In 2 days  Return if any concerns, any new behavioral changes, chest pain, shortness of breath, fever, or any  concerns.         Medication List      No changes were made to your prescriptions during this visit.           Latest Documented Vital Signs:  As of 8:15 PM  BP- 123/68 HR- 54 Temp- 97.5 °F (36.4 °C) (Tympanic) O2 sat- 92%    --  Documentation assistance provided by lila Benz for Dr. Sanz.  Information recorded by the scribe was done at my direction and has been verified and validated by me.       Trinity Benz  12/26/19 1810       Timothy Sazn MD  12/26/19 2015

## 2019-12-26 NOTE — ED NOTES
"Per EMS, pt to this ER from NH for AMS, \"not acting herself\" with decrease in appetite, ao status; pt usually only oriented to self, currently oriented to self.  Pt appears alert at this time, no specific complaints.     Andi Zarate RN  12/26/19 8454    "